# Patient Record
Sex: MALE | Race: ASIAN | Employment: STUDENT | URBAN - METROPOLITAN AREA
[De-identification: names, ages, dates, MRNs, and addresses within clinical notes are randomized per-mention and may not be internally consistent; named-entity substitution may affect disease eponyms.]

---
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
## 2024-01-07 ENCOUNTER — OFFICE VISIT (OUTPATIENT)
Dept: FAMILY MEDICINE CLINIC | Facility: CLINIC | Age: 19
End: 2024-01-07
Payer: COMMERCIAL

## 2024-01-07 VITALS
RESPIRATION RATE: 18 BRPM | DIASTOLIC BLOOD PRESSURE: 75 MMHG | TEMPERATURE: 97 F | BODY MASS INDEX: 24.97 KG/M2 | SYSTOLIC BLOOD PRESSURE: 126 MMHG | HEART RATE: 82 BPM | OXYGEN SATURATION: 97 % | HEIGHT: 71 IN | WEIGHT: 178.38 LBS

## 2024-01-07 DIAGNOSIS — J02.9 SORE THROAT: Primary | ICD-10-CM

## 2024-01-07 LAB
CONTROL LINE PRESENT WITH A CLEAR BACKGROUND (YES/NO): YES YES/NO
KIT LOT #: NORMAL NUMERIC
STREP GRP A CUL-SCR: NEGATIVE

## 2024-01-07 PROCEDURE — 3074F SYST BP LT 130 MM HG: CPT | Performed by: PHYSICIAN ASSISTANT

## 2024-01-07 PROCEDURE — 3078F DIAST BP <80 MM HG: CPT | Performed by: PHYSICIAN ASSISTANT

## 2024-01-07 PROCEDURE — 99213 OFFICE O/P EST LOW 20 MIN: CPT | Performed by: PHYSICIAN ASSISTANT

## 2024-01-07 PROCEDURE — 87081 CULTURE SCREEN ONLY: CPT | Performed by: PHYSICIAN ASSISTANT

## 2024-01-07 PROCEDURE — 87880 STREP A ASSAY W/OPTIC: CPT | Performed by: PHYSICIAN ASSISTANT

## 2024-01-07 PROCEDURE — 3008F BODY MASS INDEX DOCD: CPT | Performed by: PHYSICIAN ASSISTANT

## 2024-01-07 NOTE — PROGRESS NOTES
Chief Complaint   Patient presents with    Sore Throat     Sore throat, hurts when swallowing   Denies fever, headache, body aches   Denies OTC        HPI:   Winston Bradley is a 18 year old male who presents for sore throat for a week.  Patient had no known exposure to COVID or strep. Mild symptoms at this time. Associated symptoms are as follows:  Fever/chills/sweats: no  Sore throat: yes  Cough: no  Nasal Congestion: no  Body ache: no  Rash:no  Headache:no  SOB/Dyspnea:no  Loss of taste: no  Loss of smell: no  Abdominal pain: no  Diarrhea: no  Vomiting:no   Patient is eating and drinking well. Would like strep testing.         Current Outpatient Medications   Medication Sig Dispense Refill    fexofenadine 180 MG Oral Tab Take 1 tablet (180 mg total) by mouth daily.      EPINEPHrine 0.3 MG/0.3ML Injection Device Inject 0.3 mg into the muscle daily as needed. (Patient not taking: Reported on 1/7/2024) 1 Device 0      No past medical history on file.   No past surgical history on file.   No family history on file.   Social History     Socioeconomic History    Marital status: Single         REVIEW OF SYSTEMS:   GENERAL:  good appetite  SKIN: no rashes or abnormal skin lesions  HEENT: See HPI.    LUNGS: denies shortness of breath or wheezing   CARDIOVASCULAR: denies chest pain or palpitations   GI: denies abdominal pain or nausea  NEURO: no sinus headache, denies dizziness    EXAM:   /75   Pulse 82   Temp 96.7 °F (35.9 °C)   Resp 18   Ht 5' 11\" (1.803 m)   Wt 178 lb 6.4 oz (80.9 kg)   SpO2 97%   BMI 24.88 kg/m²   GENERAL: alert and oriented x 3, no acute distress  SKIN: no rashes, no suspicious lesions  HEAD: atraumatic, normocephalic, non tenderness on palpation of maxillary sinuses, no frontal sinus tenderness  EYES: conjunctiva clear, EOM intact  EARS: TM's no erythema, bulging, or retraction bilaterally, no fluid, bony landmarks intact  NOSE: nostrils patent, turbinates with no swelling, no nasal  mucous, nasal mucosa pink and moist  THROAT: oral mucosa pink and moist. No visible dental caries. Posterior pharynx with scant erythema, no exudates, tonsils 3/4, uvula midline, no abscess  NECK: supple, non-tender  LUNGS: non labored breathing,  clear to auscultation bilaterally, no wheezing, rales, or rhonchi.  CARDIO: RRR without murmur  EXTREMITIES: no cyanosis, clubbing or edema  LYMPH:  no cervical, submandibular, periauricular, or supraclavicular lymphadenopathy.      No results found for this or any previous visit (from the past 24 hour(s)).    ASSESSMENT:   Winston Bradlye is a 18 year old male who presents with Sore Throat (Sore throat, hurts when swallowing /Denies fever, headache, body aches /Denies OTC ). Symptoms are consistent with:    Encounter Diagnosis   Name Primary?    Sore throat Yes       PLAN   Maintain fluid intake for continued hydration. Nasal saline to keep nasal passages moist and promote sinus drainage. Tylenol/motrin prn.  Rapid strep negative, will send culture.  Total time of visit including but not limited to obtaining and reviewing history, medically appropriate evaluation, counseling/education greater than 20 minutes .   Patient instructions also in MyChart.   Follow up prn or with pcp/clinic if symptoms develop / worsen within 1-2 days as discussed, ED precautions advised.   Patient understands and agrees with plan.     Codie Batista PA-C  1/7/2024  12:41 PM

## 2024-01-11 ENCOUNTER — OFFICE VISIT (OUTPATIENT)
Dept: OTOLARYNGOLOGY | Facility: CLINIC | Age: 19
End: 2024-01-11
Payer: COMMERCIAL

## 2024-01-11 DIAGNOSIS — R07.0 THROAT PAIN: Primary | ICD-10-CM

## 2024-01-11 PROCEDURE — 99203 OFFICE O/P NEW LOW 30 MIN: CPT | Performed by: OTOLARYNGOLOGY

## 2024-01-11 RX ORDER — AMOXICILLIN AND CLAVULANATE POTASSIUM 875; 125 MG/1; MG/1
1 TABLET, FILM COATED ORAL EVERY 12 HOURS
Qty: 20 TABLET | Refills: 0 | Status: SHIPPED | OUTPATIENT
Start: 2024-01-11

## 2024-01-11 RX ORDER — AZELASTINE 1 MG/ML
2 SPRAY, METERED NASAL 2 TIMES DAILY
Qty: 30 ML | Refills: 0 | Status: SHIPPED | OUTPATIENT
Start: 2024-01-11

## 2024-01-11 RX ORDER — MONTELUKAST SODIUM 10 MG/1
10 TABLET ORAL NIGHTLY
Qty: 30 TABLET | Refills: 3 | Status: SHIPPED | OUTPATIENT
Start: 2024-01-11

## 2024-01-11 NOTE — PROGRESS NOTES
Winston Bradley is a 18 year old male.    Chief Complaint   Patient presents with    Throat Problem     Patient is here due to recurrent sore throat x 5 in a 4 month time span, reports negative strep test. Reports wanting surgical intervention with tonsil.       HISTORY OF PRESENT ILLNESS    Prior episodes of recurrent throat pain and a 4-month time span.  All negative strep test.  He does complain of nasal congestive issues.  Has been blowing out some nasal mucopurulent material recently.  Seen in immediate care roughly a few days ago and told that he was negative for strep.  He has used Allegra on his own with some improvement in his symptoms.  Allergies?    Social History     Socioeconomic History    Marital status: Single   Tobacco Use    Smoking status: Never    Smokeless tobacco: Never   Vaping Use    Vaping Use: Never used   Substance and Sexual Activity    Alcohol use: Not Currently    Drug use: Never       History reviewed. No pertinent family history.    History reviewed. No pertinent past medical history.    History reviewed. No pertinent surgical history.      REVIEW OF SYSTEMS    System Neg/Pos Details   Constitutional Negative Fatigue, fever and weight loss.   ENMT Negative Drooling.   Eyes Negative Blurred vision and vision changes.   Respiratory Negative Dyspnea and wheezing.   Cardio Negative Chest pain, irregular heartbeat/palpitations and syncope.   GI Negative Abdominal pain and diarrhea.   Endocrine Negative Cold intolerance and heat intolerance.   Neuro Negative Tremors.   Psych Negative Anxiety and depression.   Integumentary Negative Frequent skin infections, pigment change and rash.   Hema/Lymph Negative Easy bleeding and easy bruising.           PHYSICAL EXAM    There were no vitals taken for this visit.       Constitutional Normal Overall appearance - Normal.   Psychiatric Normal Orientation - Oriented to time, place, person & situation. Appropriate mood and affect.   Neck Exam Normal  Inspection - Normal. Palpation - Normal. Parotid gland - Normal. Thyroid gland - Normal.   Eyes Normal Conjunctiva - Right: Normal, Left: Normal. Pupil - Right: Normal, Left: Normal. Fundus - Right: Normal, Left: Normal.   Neurological Normal Memory - Normal. Cranial nerves - Cranial nerves II through XII grossly intact.   Head/Face Normal Facial features - Normal. Eyebrows - Normal. Skull - Normal.        Nasopharynx Normal External nose - Normal. Lips/teeth/gums - Normal. Tonsils - Normal. Oropharynx - Normal.   Ears Normal Inspection - Right: Normal, Left: Normal. Canal - Right: Normal, Left: Normal. TM - Right: Normal, Left: Normal.   Skin Normal Inspection - Normal.        Lymph Detail Normal Submental. Submandibular. Anterior cervical. Posterior cervical. Supraclavicular.        Nose/Mouth/Throat Normal External nose - Normal. Lips/teeth/gums - Normal. Tonsils - Normal. Oropharynx -very erythematous  posterior pharynx   Nose/Mouth/Throat Normal Nares - Right: Normal Left: Normal. Septum -Normal  Turbinates - Right: Normal, Left: Normal.       Current Outpatient Medications:     amoxicillin clavulanate 875-125 MG Oral Tab, Take 1 tablet by mouth every 12 (twelve) hours., Disp: 20 tablet, Rfl: 0    loratadine-pseudoephedrine ER 5-120 MG Oral Tablet 12 Hr, Take 1 tablet by mouth every 12 (twelve) hours., Disp: 60 tablet, Rfl: 3    azelastine 0.1 % Nasal Solution, 2 sprays by Nasal route 2 (two) times daily., Disp: 30 mL, Rfl: 0    montelukast 10 MG Oral Tab, Take 1 tablet (10 mg total) by mouth nightly., Disp: 30 tablet, Rfl: 3    fexofenadine 180 MG Oral Tab, Take 1 tablet (180 mg total) by mouth daily., Disp: , Rfl:     EPINEPHrine 0.3 MG/0.3ML Injection Device, Inject 0.3 mg into the muscle daily as needed., Disp: 1 Device, Rfl: 0  ASSESSMENT AND PLAN    1. Throat pain  Very congested nasal mucosa slight deviation of the septum.  I suspect that most of his throat issues are due to an acute sinus infection left  untreated.  Start Augmentin Claritin-D Singulair Astelin nasal spray return to see me in 1 month.  I do not suspect that he will require tonsillectomy at this time.  - loratadine-pseudoephedrine ER 5-120 MG Oral Tablet 12 Hr; Take 1 tablet by mouth every 12 (twelve) hours.  Dispense: 60 tablet; Refill: 3        This note was prepared using [employer] voice recognition dictation software. As a result errors may occur. When identified these errors have been corrected. While every attempt is made to correct errors during dictation discrepancies may still exist    Geraldo Nicole MD    1/11/2024    5:27 PM

## 2024-08-09 ENCOUNTER — APPOINTMENT (OUTPATIENT)
Dept: CT IMAGING | Facility: HOSPITAL | Age: 19
End: 2024-08-09
Attending: PEDIATRICS
Payer: COMMERCIAL

## 2024-08-09 ENCOUNTER — HOSPITAL ENCOUNTER (EMERGENCY)
Facility: HOSPITAL | Age: 19
Discharge: HOME | End: 2024-08-09
Attending: PEDIATRICS
Payer: COMMERCIAL

## 2024-08-09 ENCOUNTER — HOSPITAL ENCOUNTER (OUTPATIENT)
Age: 19
Discharge: ANOTHER HOSPITAL | End: 2024-08-09
Payer: COMMERCIAL

## 2024-08-09 VITALS
SYSTOLIC BLOOD PRESSURE: 130 MMHG | HEART RATE: 60 BPM | RESPIRATION RATE: 18 BRPM | BODY MASS INDEX: 24.58 KG/M2 | HEIGHT: 72 IN | DIASTOLIC BLOOD PRESSURE: 73 MMHG | WEIGHT: 181.44 LBS | TEMPERATURE: 98 F | OXYGEN SATURATION: 100 %

## 2024-08-09 DIAGNOSIS — R19.7 ABDOMINAL PAIN, VOMITING, AND DIARRHEA: Primary | ICD-10-CM

## 2024-08-09 DIAGNOSIS — R10.9 ABDOMINAL PAIN, VOMITING, AND DIARRHEA: Primary | ICD-10-CM

## 2024-08-09 DIAGNOSIS — R11.10 ABDOMINAL PAIN, VOMITING, AND DIARRHEA: Primary | ICD-10-CM

## 2024-08-09 LAB
ALBUMIN SERPL-MCNC: 5.1 G/DL (ref 3.2–4.8)
ALBUMIN/GLOB SERPL: 1.8 {RATIO} (ref 1–2)
ALP LIVER SERPL-CCNC: 74 U/L
ALT SERPL-CCNC: 22 U/L
ANION GAP SERPL CALC-SCNC: 3 MMOL/L (ref 0–18)
AST SERPL-CCNC: 24 U/L (ref ?–34)
BASOPHILS # BLD AUTO: 0.04 X10(3) UL (ref 0–0.2)
BASOPHILS NFR BLD AUTO: 0.5 %
BILIRUB SERPL-MCNC: 0.6 MG/DL (ref 0.3–1.2)
BILIRUB UR QL STRIP.AUTO: NEGATIVE
BUN BLD-MCNC: 13 MG/DL (ref 9–23)
CALCIUM BLD-MCNC: 10.2 MG/DL (ref 8.7–10.4)
CHLORIDE SERPL-SCNC: 104 MMOL/L (ref 98–112)
CO2 SERPL-SCNC: 28 MMOL/L (ref 21–32)
COLOR UR AUTO: YELLOW
CREAT BLD-MCNC: 0.96 MG/DL
CRP SERPL-MCNC: <0.4 MG/DL (ref ?–0.5)
EGFRCR SERPLBLD CKD-EPI 2021: 117 ML/MIN/1.73M2 (ref 60–?)
EOSINOPHIL # BLD AUTO: 0.28 X10(3) UL (ref 0–0.7)
EOSINOPHIL NFR BLD AUTO: 3.7 %
ERYTHROCYTE [DISTWIDTH] IN BLOOD BY AUTOMATED COUNT: 11.9 %
GLOBULIN PLAS-MCNC: 2.9 G/DL (ref 2–3.5)
GLUCOSE BLD-MCNC: 137 MG/DL (ref 70–99)
GLUCOSE UR STRIP.AUTO-MCNC: NORMAL MG/DL
HCT VFR BLD AUTO: 42.2 %
HGB BLD-MCNC: 15.3 G/DL
IGA SERPL-MCNC: 241.7 MG/DL (ref 70–312)
IMM GRANULOCYTES # BLD AUTO: 0.02 X10(3) UL (ref 0–1)
IMM GRANULOCYTES NFR BLD: 0.3 %
KETONES UR STRIP.AUTO-MCNC: NEGATIVE MG/DL
LEUKOCYTE ESTERASE UR QL STRIP.AUTO: NEGATIVE
LIPASE SERPL-CCNC: 29 U/L (ref 12–53)
LYMPHOCYTES # BLD AUTO: 1.27 X10(3) UL (ref 1.5–5)
LYMPHOCYTES NFR BLD AUTO: 17 %
MCH RBC QN AUTO: 30.3 PG (ref 26–34)
MCHC RBC AUTO-ENTMCNC: 36.3 G/DL (ref 31–37)
MCV RBC AUTO: 83.6 FL
MONOCYTES # BLD AUTO: 0.3 X10(3) UL (ref 0.1–1)
MONOCYTES NFR BLD AUTO: 4 %
NEUTROPHILS # BLD AUTO: 5.56 X10 (3) UL (ref 1.5–7.7)
NEUTROPHILS # BLD AUTO: 5.56 X10(3) UL (ref 1.5–7.7)
NEUTROPHILS NFR BLD AUTO: 74.5 %
NITRITE UR QL STRIP.AUTO: NEGATIVE
OSMOLALITY SERPL CALC.SUM OF ELEC: 282 MOSM/KG (ref 275–295)
PH UR STRIP.AUTO: 7.5 [PH] (ref 5–8)
PLATELET # BLD AUTO: 259 10(3)UL (ref 150–450)
POTASSIUM SERPL-SCNC: 4 MMOL/L (ref 3.5–5.1)
PROT SERPL-MCNC: 8 G/DL (ref 5.7–8.2)
PROT UR STRIP.AUTO-MCNC: NEGATIVE MG/DL
RBC # BLD AUTO: 5.05 X10(6)UL
RBC UR QL AUTO: NEGATIVE
SODIUM SERPL-SCNC: 135 MMOL/L (ref 136–145)
SP GR UR STRIP.AUTO: 1.02 (ref 1–1.03)
UROBILINOGEN UR STRIP.AUTO-MCNC: NORMAL MG/DL
WBC # BLD AUTO: 7.5 X10(3) UL (ref 4–11)

## 2024-08-09 PROCEDURE — 81001 URINALYSIS AUTO W/SCOPE: CPT | Performed by: PEDIATRICS

## 2024-08-09 PROCEDURE — 96374 THER/PROPH/DIAG INJ IV PUSH: CPT

## 2024-08-09 PROCEDURE — 80053 COMPREHEN METABOLIC PANEL: CPT | Performed by: PEDIATRICS

## 2024-08-09 PROCEDURE — 82784 ASSAY IGA/IGD/IGG/IGM EACH: CPT | Performed by: PEDIATRICS

## 2024-08-09 PROCEDURE — 96375 TX/PRO/DX INJ NEW DRUG ADDON: CPT

## 2024-08-09 PROCEDURE — 83690 ASSAY OF LIPASE: CPT | Performed by: PEDIATRICS

## 2024-08-09 PROCEDURE — 96361 HYDRATE IV INFUSION ADD-ON: CPT

## 2024-08-09 PROCEDURE — 99284 EMERGENCY DEPT VISIT MOD MDM: CPT

## 2024-08-09 PROCEDURE — 85025 COMPLETE CBC W/AUTO DIFF WBC: CPT | Performed by: PEDIATRICS

## 2024-08-09 PROCEDURE — 86140 C-REACTIVE PROTEIN: CPT | Performed by: PEDIATRICS

## 2024-08-09 PROCEDURE — 86364 TISS TRNSGLTMNASE EA IG CLAS: CPT | Performed by: PEDIATRICS

## 2024-08-09 RX ORDER — KETOROLAC TROMETHAMINE 30 MG/ML
15 INJECTION, SOLUTION INTRAMUSCULAR; INTRAVENOUS ONCE
Status: COMPLETED | OUTPATIENT
Start: 2024-08-09 | End: 2024-08-09

## 2024-08-09 RX ORDER — ONDANSETRON 4 MG/1
4 TABLET, ORALLY DISINTEGRATING ORAL EVERY 6 HOURS PRN
Qty: 10 TABLET | Refills: 0 | Status: SHIPPED | OUTPATIENT
Start: 2024-08-09 | End: 2024-08-16

## 2024-08-09 RX ORDER — ONDANSETRON 2 MG/ML
4 INJECTION INTRAMUSCULAR; INTRAVENOUS ONCE
Status: COMPLETED | OUTPATIENT
Start: 2024-08-09 | End: 2024-08-09

## 2024-08-09 NOTE — ED PROVIDER NOTES
Patient Seen in: [facility] Emergency Department      History     Chief Complaint   Patient presents with    Abdomen/Flank Pain     Stated Complaint: abd pain    Subjective:   18-year-old healthy male presents with concern for recurrent intermittent abdominal pain for the last 3 months.  Patient states that he usually has periumbilical abdominal pain with some nausea and today had some episodes of vomiting with loose nonbloody diarrheal stools.  No reported fevers, weight loss, appetite changes, urinary symptoms, joint swelling or rashes.  Patient has not seen healthcare provider due to the symptoms and is concerned something else might be going on therefore presented to the emergency department.  No prior abdominal surgeries.  Patient did try some Pepto-Bismol earlier with some relief.  Patient also denies any recent illness or new medications.  No significant family history of Crohn's or ulcerative colitis.            Objective:   History reviewed. No pertinent past medical history.           History reviewed. No pertinent surgical history.             Social History     Socioeconomic History    Marital status: Single   Tobacco Use    Smoking status: Never    Smokeless tobacco: Never   Vaping Use    Vaping status: Never Used   Substance and Sexual Activity    Alcohol use: Yes     Comment: occ.    Drug use: Never              Review of Systems   Constitutional:  Negative for appetite change and fever.   HENT:  Negative for congestion.    Respiratory:  Negative for cough.    Gastrointestinal:  Positive for abdominal pain, diarrhea, nausea and vomiting. Negative for blood in stool.   Genitourinary:  Negative for dysuria.   Musculoskeletal:  Negative for arthralgias, joint swelling, neck pain and neck stiffness.   Skin:  Negative for rash.   Allergic/Immunologic: Negative for immunocompromised state.   Neurological:  Negative for dizziness.   Hematological:  Does not bruise/bleed easily.       Positive for  stated Chief Complaint: Abdomen/Flank Pain    Other systems are as noted in HPI.  Constitutional and vital signs reviewed.      All other systems reviewed and negative except as noted above.    Physical Exam     ED Triage Vitals [08/09/24 1355]   /79   Pulse 63   Resp 18   Temp 97.9 °F (36.6 °C)   Temp src    SpO2 96 %   O2 Device None (Room air)       Current Vitals:   Vital Signs  BP: 132/79  Pulse: 63  Resp: 18  Temp: 97.9 °F (36.6 °C)    Oxygen Therapy  SpO2: 96 %  O2 Device: None (Room air)            Physical Exam  Vitals and nursing note reviewed.   Constitutional:       General: He is not in acute distress.     Appearance: Normal appearance. He is not ill-appearing, toxic-appearing or diaphoretic.      Comments: pleasant and well-appearing, in no apparent distress   HENT:      Head: Normocephalic and atraumatic.      Nose: Nose normal.      Mouth/Throat:      Mouth: Mucous membranes are moist.      Pharynx: Oropharynx is clear.   Eyes:      Extraocular Movements: Extraocular movements intact.      Conjunctiva/sclera: Conjunctivae normal.      Pupils: Pupils are equal, round, and reactive to light.   Cardiovascular:      Rate and Rhythm: Normal rate and regular rhythm.      Pulses: Normal pulses.   Pulmonary:      Effort: Pulmonary effort is normal.   Abdominal:      General: Abdomen is flat. Bowel sounds are normal. There is no distension.      Palpations: Abdomen is soft. There is no mass.      Tenderness: There is no right CVA tenderness, left CVA tenderness, guarding or rebound.      Comments: Very mild periumbilical tenderness, no rebound or guarding, no right lower quadrant, suprapubic, left lower quadrant or bilateral upper quadrant tenderness    No CVA tenderness   Musculoskeletal:         General: Normal range of motion.      Cervical back: Normal range of motion and neck supple. No rigidity.   Lymphadenopathy:      Cervical: No cervical adenopathy.   Skin:     General: Skin is warm.       Capillary Refill: Capillary refill takes less than 2 seconds.   Neurological:      General: No focal deficit present.      Mental Status: He is alert and oriented to person, place, and time.      Cranial Nerves: No cranial nerve deficit.      Sensory: No sensory deficit.   Psychiatric:         Mood and Affect: Mood normal.           ED Course     Labs Reviewed   COMP METABOLIC PANEL (14) - Abnormal; Notable for the following components:       Result Value    Glucose 137 (*)     Sodium 135 (*)     Albumin 5.1 (*)     All other components within normal limits   CBC WITH DIFFERENTIAL WITH PLATELET - Abnormal; Notable for the following components:    Lymphocyte Absolute 1.27 (*)     All other components within normal limits   LIPASE - Normal   C-REACTIVE PROTEIN - Normal   URINALYSIS WITH CULTURE REFLEX   CELIAC DISEASE SCREEN REFLEX          ED Course as of 08/09/24 1525  ------------------------------------------------------------  Time: 08/09 1525  Comment: Patient states that he has a flight to catch later this afternoon and would like to hold off on the abdominal CT for now.  Serum lab work mostly unremarkable.  I feel that this is reasonable given that concern for acute surgical abdomen low.  Will discharge home to continue as needed Zofran, as needed Levsin as well as outpatient GI follow-up.  Recommend close PCP follow-up with strict return precautions to the ED.     Assessment & Plan: Well-appearing with nonspecific chronic abdominal pain.  Possible IBS, gastritis, viral gastroenteritis.  Less likely acute surgical abdomen or inflammatory bowel disease.  Then let us will obtain lab work including CBC, CMP, lipase and CRP as well as celiac panel.  Will also obtain abdominal and pelvic CT to rule out any potential serious pathology.  Patient will receive IV fluid bolus as well as Zofran and Toradol.     Independent historian: Mother   Pertinent co-morbidities affecting presentation: None   Differential diagnoses  considered: I considered various etiologies / differetial diagosis including but not limited to, IBS, viral gastroenteritis, chronic abdominal pain, gastritis, less likely acute surgical abdomen or inflammatory bowel disease. The patient was well-appearing and did not show any evidence of serious bacterial infection.  Diagnostic tests considered but not performed: Stool studies -low concern for severe infectious process    ED Course:    Prescription drug management considerations: prn Zofran, prn Levsin  Consideration regarding hospitalization or escalation of care: None   Social determinants of health: None       I have considered other serious etiologies for this patient's complaints, however the presentation is not consistent with such entities. Patient was screened and evaluated during this visit.   As a treating physician attending to the patient, I determined, within reasonable clinical confidence and prior to discharge, that an emergency medical condition was not or was no longer present. Patient or caregiver understands the course of events that occurred in the emergency department. Instructions when to seek emergent medical care was reviewed. Advised parent or caregiver to follow up with primary care physician.        This report has been produced using speech recognition software and may contain errors related to that system including, but not limited to, errors in grammar, punctuation, and spelling, as well as words and phrases that possibly may have been recognized inappropriately.  If there are any questions or concerns, contact the dictating provider for clarification.           MDM      Radiology:  Imaging ordered independently visualized and interpreted by myself (along with review of radiologist's interpretation) and noted the following:     No results found.    Labs:  ^^ Personally ordered, reviewed, and interpreted all unique tests ordered.  Clinically significant labs noted: mostly unremarkable      Medications administered:  Medications   sodium chloride 0.9 % IV bolus 1,000 mL (1,000 mL Intravenous New Bag 8/9/24 1439)   ondansetron (Zofran) 4 MG/2ML injection 4 mg (4 mg Intravenous Given 8/9/24 1439)   ketorolac (Toradol) 30 MG/ML injection 15 mg (15 mg Intravenous Given 8/9/24 1440)       Pulse oximetry:  Pulse oximetry on room air is 96% and is normal.     Cardiac monitoring:  Initial heart rate is 63 and is normal for age    Vital signs:  Vitals:    08/09/24 1355 08/09/24 1404   BP: 132/79    Pulse: 63    Resp: 18    Temp: 97.9 °F (36.6 °C)    SpO2: 96%    Weight: 81.6 kg 82.3 kg   Height: 182.9 cm (6')        Chart review:  ^^ Review of prior external notes from unique sources (non-[place] ED records): noted in history : None       Disposition and Plan     Clinical Impression:  1. Abdominal pain, vomiting, and diarrhea         Disposition:  Discharge  8/9/2024  3:21 pm    Follow-up:  [facility]  [facility] [facility] [address]  Schedule an appointment as soon as possible for a visit      [facility] Emergency Department  [address] [place] [id number]  [phone]  Follow up  If symptoms worsen    PCP    Schedule an appointment as soon as possible for a visit            Medications Prescribed:  Current Discharge Medication List        START taking these medications    Details   ondansetron 4 MG Oral Tablet Dispersible Take 1 tablet (4 mg total) by mouth every 6 (six) hours as needed.  Qty: 10 tablet, Refills: 0      hyoscyamine (LEVSIN/SL) 0.125 MG Sublingual SL Tab Place 1 tablet (125 mcg total) under the tongue every 6 (six) hours as needed.  Qty: 10 tablet, Refills: 0

## 2024-08-09 NOTE — ED QUICK NOTES
Report given to DYLAN Hand who is assuming care of pt at this time. Pt remains resting quietly, easy WOB, await CT imaging

## 2024-08-09 NOTE — ED INITIAL ASSESSMENT (HPI)
Pt presents to ER with abd pain starting last night. Pain is in center of abd. Pt has noticed pain coming and going for several months, but today more painful. Yes n/v, yes diarrhea. Runny stool. No fever. Abd pain is worse at night. Pt is speaking clearly. Skin warm and dry, respirations equal and nonlabored. Vomited relived pain, but then pain returned.

## 2024-08-09 NOTE — DISCHARGE INSTRUCTIONS
Avoid greasy fatty foods for the next few days.  Take Zofran as needed for nausea, take Levsin as needed for cramping abdominal pain.  Call to follow-up with GI and your primary care doctor.  Seek immediate medical care if you have severely worsening abdominal pain, recurring vomiting despite using Zofran, large amounts of blood in the stool, fevers lasting greater than 4 to 5 days or any other major concerns.

## 2024-08-12 LAB — TTG IGA SER-ACNC: 0.7 U/ML (ref ?–7)

## 2025-07-08 ENCOUNTER — LAB ENCOUNTER (OUTPATIENT)
Dept: LAB | Facility: HOSPITAL | Age: 20
End: 2025-07-08
Attending: Other
Payer: COMMERCIAL

## 2025-07-08 DIAGNOSIS — R41.89 BRAIN FOG: ICD-10-CM

## 2025-07-08 DIAGNOSIS — R76.8 ANA POSITIVE: ICD-10-CM

## 2025-07-08 PROBLEM — H53.8 BLURRY VISION: Status: ACTIVE | Noted: 2025-07-08

## 2025-07-08 LAB — VIT B12 SERPL-MCNC: 601 PG/ML (ref 211–911)

## 2025-07-08 PROCEDURE — 82607 VITAMIN B-12: CPT | Performed by: OTHER

## 2025-07-08 PROCEDURE — 86039 ANTINUCLEAR ANTIBODIES (ANA): CPT | Performed by: OTHER

## 2025-07-08 PROCEDURE — 86038 ANTINUCLEAR ANTIBODIES: CPT | Performed by: OTHER

## 2025-07-11 LAB
ANA NUCLEOLAR TITR SER IF: 320 {TITER}
NUCLEAR IGG TITR SER IF: POSITIVE {TITER}

## 2025-07-21 ENCOUNTER — RESULTS FOLLOW-UP (OUTPATIENT)
Dept: NEUROLOGY | Facility: CLINIC | Age: 20
End: 2025-07-21

## 2025-07-21 DIAGNOSIS — R76.8 ANA POSITIVE: Primary | ICD-10-CM

## 2025-07-21 NOTE — TELEPHONE ENCOUNTER
Phone call place to patient to discuss test result. Patient understood. [employer] message sent with phone number for Rheumatologist. Patient had no further question.

## 2025-08-05 ENCOUNTER — OFFICE VISIT (OUTPATIENT)
Age: 20
End: 2025-08-05

## 2025-08-05 ENCOUNTER — LAB ENCOUNTER (OUTPATIENT)
Dept: LAB | Facility: HOSPITAL | Age: 20
End: 2025-08-05
Attending: INTERNAL MEDICINE

## 2025-08-05 VITALS
DIASTOLIC BLOOD PRESSURE: 72 MMHG | BODY MASS INDEX: 24.64 KG/M2 | WEIGHT: 176 LBS | HEART RATE: 66 BPM | HEIGHT: 71 IN | SYSTOLIC BLOOD PRESSURE: 115 MMHG

## 2025-08-05 DIAGNOSIS — R76.8 POSITIVE ANA (ANTINUCLEAR ANTIBODY): ICD-10-CM

## 2025-08-05 DIAGNOSIS — R41.89 BRAIN FOG: ICD-10-CM

## 2025-08-05 DIAGNOSIS — R76.8 POSITIVE ANA (ANTINUCLEAR ANTIBODY): Primary | ICD-10-CM

## 2025-08-05 LAB
TSI SER-ACNC: 1.29 UIU/ML (ref 0.55–4.78)
VIT D+METAB SERPL-MCNC: 28.3 NG/ML (ref 30–100)

## 2025-08-05 PROCEDURE — 3074F SYST BP LT 130 MM HG: CPT | Performed by: INTERNAL MEDICINE

## 2025-08-05 PROCEDURE — 3008F BODY MASS INDEX DOCD: CPT | Performed by: INTERNAL MEDICINE

## 2025-08-05 PROCEDURE — 99204 OFFICE O/P NEW MOD 45 MIN: CPT | Performed by: INTERNAL MEDICINE

## 2025-08-05 PROCEDURE — 86235 NUCLEAR ANTIGEN ANTIBODY: CPT

## 2025-08-05 PROCEDURE — 82306 VITAMIN D 25 HYDROXY: CPT

## 2025-08-05 PROCEDURE — 86225 DNA ANTIBODY NATIVE: CPT | Performed by: INTERNAL MEDICINE

## 2025-08-05 PROCEDURE — 84443 ASSAY THYROID STIM HORMONE: CPT

## 2025-08-05 PROCEDURE — 36415 COLL VENOUS BLD VENIPUNCTURE: CPT | Performed by: INTERNAL MEDICINE

## 2025-08-05 PROCEDURE — 3078F DIAST BP <80 MM HG: CPT | Performed by: INTERNAL MEDICINE

## 2025-08-05 RX ORDER — FEXOFENADINE HCL 60 MG/1
60 TABLET, FILM COATED ORAL DAILY
COMMUNITY

## 2025-08-06 LAB
ENA RNP IGG SER IA-ACNC: 2.3 U/ML (ref ?–7)
ENA SM IGG SER IA-ACNC: 0.8 U/ML (ref ?–7)
ENA SS-A IGG SER IA-ACNC: 0.8 U/ML (ref ?–7)
ENA SS-B IGG SER IA-ACNC: 0.6 U/ML (ref ?–7)
U1 SNRNP IGG SER IA-ACNC: 1.8 U/ML (ref ?–5)

## 2025-08-07 LAB — DSDNA AB TITR SER: <10

## 2025-08-09 PROBLEM — R76.8 POSITIVE ANA (ANTINUCLEAR ANTIBODY): Status: ACTIVE | Noted: 2025-08-09
